# Patient Record
Sex: FEMALE | Race: WHITE | NOT HISPANIC OR LATINO | Employment: FULL TIME | URBAN - METROPOLITAN AREA
[De-identification: names, ages, dates, MRNs, and addresses within clinical notes are randomized per-mention and may not be internally consistent; named-entity substitution may affect disease eponyms.]

---

## 2022-08-01 ENCOUNTER — HOSPITAL ENCOUNTER (EMERGENCY)
Facility: HOSPITAL | Age: 27
Discharge: HOME | End: 2022-08-01
Attending: EMERGENCY MEDICINE

## 2022-08-01 ENCOUNTER — APPOINTMENT (EMERGENCY)
Dept: RADIOLOGY | Facility: HOSPITAL | Age: 27
End: 2022-08-01

## 2022-08-01 VITALS
HEART RATE: 80 BPM | OXYGEN SATURATION: 99 % | WEIGHT: 190 LBS | HEIGHT: 65 IN | TEMPERATURE: 98.1 F | BODY MASS INDEX: 31.65 KG/M2 | SYSTOLIC BLOOD PRESSURE: 140 MMHG | RESPIRATION RATE: 15 BRPM | DIASTOLIC BLOOD PRESSURE: 82 MMHG

## 2022-08-01 DIAGNOSIS — R42 DIZZINESS: Primary | ICD-10-CM

## 2022-08-01 LAB
ALBUMIN SERPL-MCNC: 4.6 G/DL (ref 3.4–5)
ALP SERPL-CCNC: 56 IU/L (ref 35–126)
ALT SERPL-CCNC: 18 IU/L (ref 11–54)
ANION GAP SERPL CALC-SCNC: 7 MEQ/L (ref 3–15)
AST SERPL-CCNC: 17 IU/L (ref 15–41)
BASOPHILS # BLD: 0.03 K/UL (ref 0.01–0.1)
BASOPHILS NFR BLD: 0.4 %
BILIRUB SERPL-MCNC: 0.6 MG/DL (ref 0.3–1.2)
BUN SERPL-MCNC: 12 MG/DL (ref 8–20)
CALCIUM SERPL-MCNC: 8.9 MG/DL (ref 8.9–10.3)
CHLORIDE SERPL-SCNC: 108 MEQ/L (ref 98–109)
CO2 SERPL-SCNC: 24 MEQ/L (ref 22–32)
CREAT SERPL-MCNC: 0.8 MG/DL (ref 0.6–1.1)
CRP SERPL-MCNC: 6.2 MG/L
DIFFERENTIAL METHOD BLD: NORMAL
EOSINOPHIL # BLD: 0.24 K/UL (ref 0.04–0.36)
EOSINOPHIL NFR BLD: 2.8 %
ERYTHROCYTE [DISTWIDTH] IN BLOOD BY AUTOMATED COUNT: 12.4 % (ref 11.7–14.4)
GFR SERPL CREATININE-BSD FRML MDRD: >60 ML/MIN/1.73M*2
GLUCOSE SERPL-MCNC: 105 MG/DL (ref 70–99)
HCT VFR BLDCO AUTO: 43.5 % (ref 35–45)
HGB BLD-MCNC: 14 G/DL (ref 11.8–15.7)
IMM GRANULOCYTES # BLD AUTO: 0.02 K/UL (ref 0–0.08)
IMM GRANULOCYTES NFR BLD AUTO: 0.2 %
LYMPHOCYTES # BLD: 3.05 K/UL (ref 1.2–3.5)
LYMPHOCYTES NFR BLD: 36.2 %
MCH RBC QN AUTO: 30.3 PG (ref 28–33.2)
MCHC RBC AUTO-ENTMCNC: 32.2 G/DL (ref 32.2–35.5)
MCV RBC AUTO: 94.2 FL (ref 83–98)
MONOCYTES # BLD: 0.38 K/UL (ref 0.28–0.8)
MONOCYTES NFR BLD: 4.5 %
NEUTROPHILS # BLD: 4.71 K/UL (ref 1.7–7)
NEUTS SEG NFR BLD: 55.9 %
NRBC BLD-RTO: 0 %
PDW BLD AUTO: 10.2 FL (ref 9.4–12.3)
PLATELET # BLD AUTO: 231 K/UL (ref 150–369)
POTASSIUM SERPL-SCNC: 3.5 MEQ/L (ref 3.6–5.1)
PROT SERPL-MCNC: 7.6 G/DL (ref 6–8.2)
RBC # BLD AUTO: 4.62 M/UL (ref 3.93–5.22)
SODIUM SERPL-SCNC: 139 MEQ/L (ref 136–144)
TROPONIN I SERPL HS-MCNC: 2.1 PG/ML
WBC # BLD AUTO: 8.43 K/UL (ref 3.8–10.5)

## 2022-08-01 PROCEDURE — 85652 RBC SED RATE AUTOMATED: CPT | Performed by: STUDENT IN AN ORGANIZED HEALTH CARE EDUCATION/TRAINING PROGRAM

## 2022-08-01 PROCEDURE — 93005 ELECTROCARDIOGRAM TRACING: CPT | Performed by: STUDENT IN AN ORGANIZED HEALTH CARE EDUCATION/TRAINING PROGRAM

## 2022-08-01 PROCEDURE — 80053 COMPREHEN METABOLIC PANEL: CPT

## 2022-08-01 PROCEDURE — G1004 CDSM NDSC: HCPCS

## 2022-08-01 PROCEDURE — 3E0337Z INTRODUCTION OF ELECTROLYTIC AND WATER BALANCE SUBSTANCE INTO PERIPHERAL VEIN, PERCUTANEOUS APPROACH: ICD-10-PCS | Performed by: EMERGENCY MEDICINE

## 2022-08-01 PROCEDURE — 85025 COMPLETE CBC W/AUTO DIFF WBC: CPT | Performed by: EMERGENCY MEDICINE

## 2022-08-01 PROCEDURE — 84484 ASSAY OF TROPONIN QUANT: CPT | Performed by: STUDENT IN AN ORGANIZED HEALTH CARE EDUCATION/TRAINING PROGRAM

## 2022-08-01 PROCEDURE — 99284 EMERGENCY DEPT VISIT MOD MDM: CPT | Mod: 25

## 2022-08-01 PROCEDURE — 96360 HYDRATION IV INFUSION INIT: CPT

## 2022-08-01 PROCEDURE — 36415 COLL VENOUS BLD VENIPUNCTURE: CPT

## 2022-08-01 PROCEDURE — 63700000 HC SELF-ADMINISTRABLE DRUG: Performed by: STUDENT IN AN ORGANIZED HEALTH CARE EDUCATION/TRAINING PROGRAM

## 2022-08-01 PROCEDURE — 86618 LYME DISEASE ANTIBODY: CPT | Performed by: STUDENT IN AN ORGANIZED HEALTH CARE EDUCATION/TRAINING PROGRAM

## 2022-08-01 PROCEDURE — 25800000 HC PHARMACY IV SOLUTIONS: Performed by: STUDENT IN AN ORGANIZED HEALTH CARE EDUCATION/TRAINING PROGRAM

## 2022-08-01 PROCEDURE — 80053 COMPREHEN METABOLIC PANEL: CPT | Performed by: EMERGENCY MEDICINE

## 2022-08-01 PROCEDURE — 86140 C-REACTIVE PROTEIN: CPT | Performed by: STUDENT IN AN ORGANIZED HEALTH CARE EDUCATION/TRAINING PROGRAM

## 2022-08-01 PROCEDURE — 85025 COMPLETE CBC W/AUTO DIFF WBC: CPT

## 2022-08-01 RX ORDER — MECLIZINE HYDROCHLORIDE 25 MG/1
25 TABLET ORAL ONCE
Status: COMPLETED | OUTPATIENT
Start: 2022-08-01 | End: 2022-08-01

## 2022-08-01 RX ORDER — MECLIZINE HYDROCHLORIDE 25 MG/1
25 TABLET ORAL 3 TIMES DAILY PRN
Qty: 30 TABLET | Refills: 0 | Status: SHIPPED | OUTPATIENT
Start: 2022-08-01 | End: 2022-08-08

## 2022-08-01 RX ADMIN — MECLIZINE HYDROCHLORIDE 25 MG: 25 TABLET ORAL at 19:45

## 2022-08-01 RX ADMIN — SODIUM CHLORIDE 1000 ML: 9 INJECTION, SOLUTION INTRAVENOUS at 19:45

## 2022-08-01 ASSESSMENT — ENCOUNTER SYMPTOMS
NUMBNESS: 1
FACIAL ASYMMETRY: 0
NAUSEA: 0
NECK PAIN: 0
CHILLS: 0
WEAKNESS: 0
VOMITING: 0
HEADACHES: 0
DIZZINESS: 1
LIGHT-HEADEDNESS: 0
FEVER: 0
SPEECH DIFFICULTY: 0
SHORTNESS OF BREATH: 0
CONFUSION: 0

## 2022-08-02 LAB
ATRIAL RATE: 67
ERYTHROCYTE [SEDIMENTATION RATE] IN BLOOD BY WESTERGREN METHOD: <3 MM/HR
P AXIS: 35
PR INTERVAL: 128
QRS DURATION: 86
QT INTERVAL: 422
QTC CALCULATION(BAZETT): 445
R AXIS: 41
T WAVE AXIS: -3
VENTRICULAR RATE: 67

## 2022-08-02 PROCEDURE — 93010 ELECTROCARDIOGRAM REPORT: CPT | Performed by: INTERNAL MEDICINE

## 2022-08-02 NOTE — ED PROVIDER NOTES
"Emergency Medicine Note  HPI   HISTORY OF PRESENT ILLNESS       History provided by:  Patient   used: No       28 y/o F with no significant PMH presents to the ED c/o dizziness x 1 month.  Patient reports that she has had dizziness for approximately 1 month described as \"off balance.\"  Patient reports that her dizziness is essentially constant but waxes and wanes in severity.  Dizziness is worse with positional changes and exertion and improved with resting still and closing her eyes.  Patient has been evaluated in urgent care and [facility] patient has had unremarkable blood work and has noted no ear infection.  Patient has been started on methylprednisolone without relief and the Epley maneuver has been attempted without relief.  Patient arrives here for further evaluation. Denies headaches, lightheadedness, numbness weakness in the upper or lower extremities, facial asymmetry, changes in hearing, vision, speech.  Denies nausea, vomiting, abdominal pain, chest pain, trouble breathing, neck pain, rashes, fevers, chills.  Patient has been otherwise acting at baseline per girlfriend.  No recent falls or injuries.  No recent illness or sick contacts.        Patient History   PAST HISTORY     Reviewed from Nursing Triage:  Tobacco  Allergies  Meds  Problems  Med Hx  Surg Hx  Fam Hx  Soc   Hx        History reviewed. No pertinent past medical history.    History reviewed. No pertinent surgical history.    History reviewed. No pertinent family history.    Social History     Tobacco Use   • Smoking status: Never Smoker   • Smokeless tobacco: Never Used   Substance Use Topics   • Alcohol use: Never   • Drug use: Yes     Types: Marijuana         Review of Systems   REVIEW OF SYSTEMS     Review of Systems   Constitutional: Negative for chills and fever.   Respiratory: Negative for shortness of breath.    Cardiovascular: Negative for chest pain.   Gastrointestinal: Negative for nausea and " vomiting.   Musculoskeletal: Negative for neck pain.   Skin: Negative for rash.   Neurological: Positive for dizziness and numbness. Negative for syncope, facial asymmetry, speech difficulty, weakness, light-headedness and headaches.   Psychiatric/Behavioral: Negative for confusion.         VITALS     ED Vitals    Date/Time Temp Pulse Resp BP SpO2 [facility]   08/01/22 2110 -- 80 15 140/82 99 % DWF   08/01/22 1846 36.7 °C (98.1 °F) 86 16 158/87 100 % HERLINDA        Pulse Ox %: 100 % (08/01/22 1846)  Pulse Ox Interpretation: Normal (08/01/22 1846)  Heart Rate: 86 (08/01/22 1846)  Rhythm Strip Interpretation: Other (see comments) (Sinus rhythm with marked sinus arrhythmia with PVCs.) (08/01/22 1846)     Physical Exam   PHYSICAL EXAM     Physical Exam  Vitals and nursing note reviewed.   Constitutional:       General: She is not in acute distress.     Appearance: She is well-developed.   HENT:      Head: Normocephalic and atraumatic.      Nose: Nose normal.      Mouth/Throat:      Mouth: Mucous membranes are moist.   Eyes:      Extraocular Movements: Extraocular movements intact.      Right eye: Nystagmus present.      Left eye: Nystagmus present.      Conjunctiva/sclera: Conjunctivae normal.      Pupils: Pupils are equal, round, and reactive to light.      Comments: Horizontal nystagmus when looking to the left.   Cardiovascular:      Rate and Rhythm: Normal rate and regular rhythm.      Heart sounds: Normal heart sounds.   Pulmonary:      Effort: Pulmonary effort is normal. No respiratory distress.      Breath sounds: Normal breath sounds.   Abdominal:      Palpations: Abdomen is soft.      Tenderness: There is no abdominal tenderness.   Musculoskeletal:         General: Normal range of motion.      Cervical back: Normal range of motion and neck supple.   Skin:     General: Skin is warm and dry.      Findings: No rash.   Neurological:      General: No focal deficit present.      Mental Status: She is alert and oriented to  person, place, and time.      Comments: CN II-XII grossly intact. Symmetric facial movements. Tongue is midline. Sensation to light touch is intact in B/L upper and lower extremities. 5/5 strength to B/L upper and lower extremities. Speech is clear. No pronator drift. Normal finger to nose B/L. Gait intact.            PROCEDURES     Procedures     DATA     Results     Procedure Component Value Units Date/Time    [place] ([place] [facility] [[id number]] Collected: 08/01/22 2057    Specimen: Blood, Venous Updated: 08/01/22 2109    C-reactive protein [[id number]]  (Normal) Collected: 08/01/22 1947    Specimen: Blood, Venous Updated: 08/01/22 2053     CRP 6.20 mg/L     HS Troponin I (with 2 hour reflex) [[id number]]  (Normal) Collected: 08/01/22 1947    Specimen: Blood, Venous Updated: 08/01/22 2031     High Sens Troponin I 2.1 pg/mL     Lyme EIA reflex WB [[id number]] Collected: 08/01/22 1852    Specimen: Blood, Venous Updated: 08/01/22 2026    Comprehensive metabolic panel [[id number]]  (Abnormal) Collected: 08/01/22 1852    Specimen: Blood, Venous Updated: 08/01/22 1915     Sodium 139 mEQ/L      Potassium 3.5 mEQ/L      Comment: Results obtained on plasma. Plasma Potassium values may be up to 0.4 mEQ/L less than serum values. The differences may be greater for patients with high platelet or white cell counts.        Chloride 108 mEQ/L      CO2 24 mEQ/L      BUN 12 mg/dL      Creatinine 0.8 mg/dL      Glucose 105 mg/dL      Calcium 8.9 mg/dL      AST (SGOT) 17 IU/L      ALT (SGPT) 18 IU/L      Alkaline Phosphatase 56 IU/L      Total Protein 7.6 g/dL      Comment: Test performed on plasma which typically contains approximately 0.4 g/dL more protein than serum.        Albumin 4.6 g/dL      Bilirubin, Total 0.6 mg/dL      eGFR >60.0 mL/min/1.73m*2      Anion Gap 7 mEQ/L     CBC and differential [[id number]] Collected: 08/01/22 1852    Specimen: Blood, Venous Updated: 08/01/22 1902     WBC 8.43 K/uL      RBC 4.62 M/uL      Hemoglobin  14.0 g/dL      Hematocrit 43.5 %      MCV 94.2 fL      MCH 30.3 pg      MCHC 32.2 g/dL      RDW 12.4 %      Platelets 231 K/uL      MPV 10.2 fL      Differential Type Auto     nRBC 0.0 %      Immature Granulocytes 0.2 %      Neutrophils 55.9 %      Lymphocytes 36.2 %      Monocytes 4.5 %      Eosinophils 2.8 %      Basophils 0.4 %      Immature Granulocytes, Absolute 0.02 K/uL      Neutrophils, Absolute 4.71 K/uL      Lymphocytes, Absolute 3.05 K/uL      Monocytes, Absolute 0.38 K/uL      Eosinophils, Absolute 0.24 K/uL      Basophils, Absolute 0.03 K/uL     RAINBOW GOLD [[id number]] Collected: 08/01/22 1852    Specimen: Blood, Venous Updated: 08/01/22 1857    RAINBOW RED [[id number]] Collected: 08/01/22 1852    Specimen: Blood, Venous Updated: 08/01/22 1857    [facility] Draw Panel [[id number]] Collected: 08/01/22 1852    Specimen: Blood, Venous Updated: 08/01/22 1857    Narrative:      The following orders were created for panel order [facility] Draw Panel.  Procedure                               Abnormality         Status                     ---------                               -----------         ------                     RAINBOW RED[[id number]]                                      In process                 RAINBOW LT BLUE[[id number]]                                  In process                 RAINBOW GOLD[[id number]]                                     In process                   Please view results for these tests on the individual orders.    RAINBOW LT BLUE [[id number]] Collected: 08/01/22 1852    Specimen: Blood, Venous Updated: 08/01/22 1857          Imaging Results          CT HEAD WITHOUT IV CONTRAST (Final result)  Result time 08/01/22 19:37:32    Final result                 Impression:    IMPRESSION:  No acute intracranial process.               Narrative:    CLINICAL HISTORY: Dizziness, persistent, recurring    TECHNIQUE:   CT of the head without contrast.    COMPARISON:   None available.    CT DOSE:  One  "or more dose reduction techniques (e.g. automated exposure  control, adjustment of the mA and/or kV according to patient size, use of  iterative reconstruction technique) utilized for this examination.    COMMENT:  The ventricles and sulci are normal in size and configuration. The gray-white  differentiation is preserved.  No extra axial collection. No intracranial  hemorrhage. No mass effect, midline shift, or edema. No acute, large vascular  territory infarct.  No apparent acute osseous findings in the calvarium.                                ECG 12 lead   ED Interpretation   Interpreted with Dr. Snyder.   Rate: 67  Axis: Normal  Pr interval: WNL  QRS: WNL  Qtc: 445  Sinus rhythm with sinus arrhythmia with frequent PVCs.  No obvious ischemia.             Scoring tools                                 ED Course & MDM   MDM / ED COURSE / CLINICAL IMPRESSIONS / DISPO     MDM    ED Course as of 08/01/22 2226   Mon Aug 01, 2022   1901 Impression: Dizziness described as \"off balance\" x 1 month    Plan: labs, tropes, EKG, CT head, IVF, meclizine, Observe    Discussed case with Dr. Snyder [EB]   1930 CBC and CMP unremarkable. [EB]   2012 CT HEAD IMPRESSION:  No acute intracranial process. [EB]   2056 CRP: 6.20  WNL [EB]   2059 Noted mild improvement with meclizine.  Will discharge home with meclizine, ENT follow-up, neurology follow-up, supportive care, and strict return precautions.  ESR and Lyme titer still pending. [EB]      ED Course User Index  [EB] Elena Desir PA C Boylan, Emily, PA C  08/01/22 2226    "

## 2022-08-02 NOTE — DISCHARGE INSTRUCTIONS
You were seen in the ER for dizziness. Your blood was negative for acute signs of infection, anemia, or electrolyte imbalances. Your troponin which is a cardiac biomarker for distress was negative. Your EKG showed no signs of ischemia.  Your CT head showed no acute intracranial processes.  Your Lyme testing is still pending as well as the inflammatory markers including CRP and ESR.  You will be notified of any abnormal results.    It is recommended that you follow up with ENT for further evaluation and management.  We also provided you with the contact information for a neurologist for further evaluation.    You were started on meclizine (Antivert).  Please take medication as prescribed.    You may take Motrin (500 mg) every 6 hours for pain. You may also take Tylenol (500 mg) every 6 hours for pain. You may alternate the two medications every 3 hours for complete pain control.     Please rest and get adequate sleep, approximately 7-8 hours night. Please drink plenty of fluids and stay hydrated.     Please return to the ER for any worsening dizziness, lightheadedness, chest pain, trouble breathing, palpitations, leg pain, leg swelling, back pain, nausea, vomiting, abdominal pain, or any other new or concerning symptoms      room air

## 2022-08-03 LAB — B BURGDOR AB SER IA-ACNC: 0.4 RATIO

## 2022-08-22 NOTE — ED ATTESTATION NOTE
Procedures  Physical Exam  Review of Systems    8/22/202212:33 PM  I have personally seen and examined the patient.  I reviewed and agree with the PA/NP/Resident's assessment and plan of care.    My examination, assessment, and plan of care of Renea Pabon is as follows:  The patient presents with complaints of feeling off balance.  Patient has had dizziness for the past 1 month.  Her symptoms are worse when she changes positions or exerts herself.  She has been taking a Medrol Dosepak without improvement.  Exam: The patient was alert in no acute distress.  She did become symptomatic with movement of her head and neck.  There was nystagmus with fast component to the left.  She had no focal neurologic deficits. impression/Plan: The patient was evaluated with lab work including sed rate and Lyme titer.  There were no significant findings on the available lab results.  CAT scan of the brain revealed no intracranial abnormalities.  The patient was treated with meclizine with mild improvement and was discharged home on meclizine and will follow-up with ENT and neurology.  She was given strict return cautions.    Vital Sign Review: Vital signs have been ordered and reviewed. The oxygen saturation is 100% on room air, normal     I was physically present for the key/critical portions of the following procedures: None    This document was created using dragon dictation software.  There might be some typographical errors due to this technology.     Luis Snyder, DO  08/22/22 1282

## 2022-08-30 ENCOUNTER — TRANSCRIBE ORDERS (OUTPATIENT)
Dept: SCHEDULING | Age: 27
End: 2022-08-30

## 2022-08-30 DIAGNOSIS — H90.41 SENSORINEURAL HEARING LOSS, UNILATERAL, RIGHT EAR, WITH UNRESTRICTED HEARING ON THE CONTRALATERAL SIDE: Primary | ICD-10-CM

## 2022-09-08 ENCOUNTER — PATIENT OUTREACH (OUTPATIENT)
Dept: COMMUNITY HEALTH | Facility: CLINIC | Age: 27
End: 2022-09-08
Payer: COMMERCIAL

## 2022-09-08 NOTE — PROGRESS NOTES
NAME: Renea Pabon    MRN: [id number]    YOB: 1995    PCP ASSESSMENT:    Do you have a PCP: No (Patient just got inurance-  His physician retired and he is in the process of searching for another PCP)  Would you like help finding a PCP: No (does not need any assistance at this time)          Do you have any other social needs you would like to address: No          Rani Izquierdo, MPH,[facility]

## 2022-09-14 ENCOUNTER — HOSPITAL ENCOUNTER (OUTPATIENT)
Dept: RADIOLOGY | Facility: HOSPITAL | Age: 27
Discharge: HOME | End: 2022-09-14
Attending: OTOLARYNGOLOGY
Payer: COMMERCIAL

## 2022-09-14 ENCOUNTER — TRANSCRIBE ORDERS (OUTPATIENT)
Dept: ADMINISTRATIVE | Age: 27
End: 2022-09-14

## 2022-09-14 VITALS — WEIGHT: 190 LBS | BODY MASS INDEX: 32.11 KG/M2

## 2022-09-14 DIAGNOSIS — H90.41 SENSORINEURAL HEARING LOSS, UNILATERAL, RIGHT EAR, WITH UNRESTRICTED HEARING ON THE CONTRALATERAL SIDE: Primary | ICD-10-CM

## 2022-09-14 DIAGNOSIS — H90.41 SENSORINEURAL HEARING LOSS, UNILATERAL, RIGHT EAR, WITH UNRESTRICTED HEARING ON THE CONTRALATERAL SIDE: ICD-10-CM

## 2022-09-14 PROCEDURE — 70553 MRI BRAIN STEM W/O & W/DYE: CPT

## 2022-09-14 PROCEDURE — A9585 GADOBUTROL INJECTION: HCPCS | Performed by: OTOLARYNGOLOGY

## 2022-09-14 RX ORDER — GADOBUTROL 604.72 MG/ML
0.1 INJECTION INTRAVENOUS ONCE
Status: COMPLETED | OUTPATIENT
Start: 2022-09-14 | End: 2022-09-14

## 2022-09-14 RX ADMIN — GADOBUTROL 8.6 MMOL: 604.72 INJECTION INTRAVENOUS at 18:56

## 2022-09-22 LAB
EXTERNAL HIV CONFIRMATION: NORMAL
EXTERNAL HIV SCREEN: NORMAL
HCV AB SER-ACNC: NONREACTIVE

## 2022-09-23 ENCOUNTER — TRANSCRIBE ORDERS (OUTPATIENT)
Dept: SCHEDULING | Age: 27
End: 2022-09-23

## 2022-09-23 DIAGNOSIS — G35 MULTIPLE SCLEROSIS (CMS/HCC): Primary | ICD-10-CM

## 2023-09-22 PROBLEM — G35 MULTIPLE SCLEROSIS (HCC): Status: ACTIVE | Noted: 2022-10-25

## 2023-09-22 PROBLEM — R53.83 OTHER FATIGUE: Status: ACTIVE | Noted: 2022-07-25

## 2023-09-22 PROBLEM — R26.89 OTHER ABNORMALITIES OF GAIT AND MOBILITY: Status: ACTIVE | Noted: 2022-07-25

## 2023-09-22 PROBLEM — H53.9 UNSPECIFIED VISUAL DISTURBANCE: Status: ACTIVE | Noted: 2022-07-25

## 2023-09-22 RX ORDER — OCRELIZUMAB 300 MG/10ML
INJECTION INTRAVENOUS
COMMUNITY

## 2023-09-22 NOTE — PROGRESS NOTES
ADULT ANNUAL [facility] IN PARTNERSHIP WITH [facility]'S    NAME: Magno Luke  AGE: 29 y.o. SEX: female  : 1995     DATE: 10/2/2023     Assessment and Plan:     Problem List Items Addressed This Visit        Nervous and Auditory    Multiple sclerosis [facility])     Patient follows with [facility] in [place]. She takes Ocrevus IV treatments for multiple sclerosis. Her primary symptoms are usually some form of dizziness, fatigue and visual changes during pseudo flares. She has been doing well with the IV medication. She is noticing that she gets sick more often now on immune suppressants. She needed to establish with a family doctor that she can see when she gets sick. Other    Obesity (BMI 30.0-34.9)   Other Visit Diagnoses     Annual physical exam    -  Primary    Patient will have labs done by neurology in the coming month. She will have them faxed over to our office. Screening for HIV (human immunodeficiency virus)        We will obtain records in chart    Need for hepatitis C screening test        We will obtain records in chart    Encounter for vaccination        Patient recently had DTaP last year    Cervical cancer screening        Will refer to [address] [place] [address]. Relevant Orders    Ambulatory referral to Gynecology          Immunizations and preventive care screenings were discussed with patient today. Appropriate education was printed on patient's after visit summary. Counseling:  Alcohol/drug use: discussed moderation in alcohol intake, the recommendations for healthy alcohol use, and avoidance of illicit drug use. Dental Health: discussed importance of regular tooth brushing, flossing, and dental visits. · Exercise: the importance of regular exercise/physical activity was discussed. Recommend exercise 3-5 times per week for at least 30 minutes. BMI Counseling:  Body mass index is 31.91 kg/m². The BMI is above normal. Nutrition recommendations include encouraging healthy choices of fruits and vegetables and increasing intake of lean protein. Exercise recommendations include moderate physical activity 150 minutes/week. Rationale for BMI follow-up plan is due to patient being overweight or obese. Depression Screening and Follow-up Plan: Patient was screened for depression during today's encounter. They screened negative with a PHQ-2 score of 0. Patient was seen today for an annual physical exam. Age appropriate screening tests were done as above. Annual labs were ordered to be done through [facility]. Patient will be contacted regarding results and follow up will be based on findings. Patient was counseled on the importance of proper diet and exercise. I recommend diets rich in lean meats and leafy green vegetables. Try to have 150 minutes of exercise weekly at moderate intensity. See problem based charting for any chronic problems or new findings and current workup/management. 40 minutes were spent on chart review, examination, and plan of care. This note was dictated using software. Return in about 1 year (around 10/2/2024) for Annual physical.     Chief Complaint:     Chief Complaint   Patient presents with   • Care Gap Request     Bmi follow up      History of Present Illness:     Adult Annual Physical   Patient here for a comprehensive physical exam. The patient reports establish. Diet and Physical Activity  · Diet/Nutrition: meditteranean diet. · Exercise: crossfit 4-5 times a week. Depression Screening  PHQ-2/9 Depression Screening    Little interest or pleasure in doing things: 0 - not at all  Feeling down, depressed, or hopeless: 0 - not at all  PHQ-2 Score: 0  PHQ-2 Interpretation: Negative depression screen       General Health  · Sleep: gets 7-8 hours of sleep on average.    · Hearing: normal - bilateral.  · Vision: no vision problems and vision changes with ms pseudoflares. lasik  · Dental: regular dental visits and brushes teeth twice daily. /GYN Health  · Patient needs to switch to new gyn     Review of Systems:     Review of Systems   Constitutional: Positive for fatigue. Eyes: Negative for visual disturbance (intermittent). Respiratory: Negative for shortness of breath. Cardiovascular: Negative for chest pain. Gastrointestinal: Negative for abdominal pain, constipation and diarrhea. Genitourinary: Negative for difficulty urinating. Skin: Negative for rash. Neurological: Negative for dizziness (intermittent).       Past Medical History:     Past Medical History:   Diagnosis Date   • MS (multiple sclerosis) ([address])       Past Surgical History:     Past Surgical History:   Procedure Laterality Date   • TONSILECTOMY AND ADNOIDECTOMY     • WISDOM TOOTH EXTRACTION        Social History:     Social History     Socioeconomic History   • Marital status: Single     Spouse name: None   • Number of children: None   • Years of education: None   • Highest education level: None   Occupational History   • None   Tobacco Use   • Smoking status: Never   • Smokeless tobacco: Never   Substance and Sexual Activity   • Alcohol use: Not Currently   • Drug use: Never   • Sexual activity: None   Other Topics Concern   • None   Social History Narrative   • None     Social Determinants of Health     Financial Resource Strain: Not on file   Food Insecurity: Not on file   Transportation Needs: Not on file   Physical Activity: Not on file   Stress: Not on file   Social Connections: Not on file   Intimate Partner Violence: Not on file   Housing Stability: Not on file      Family History:     Family History   Problem Relation Age of Onset   • Hypertension Mother    • Diabetes Mother    • Depression Father    • Hypertension Father    • Colon cancer Maternal Grandmother    • Cancer Maternal Grandfather    • Colon cancer Paternal Grandmother    • Colon cancer Paternal Grandfather       Current Medications:     Current Outpatient Medications   Medication Sig Dispense Refill   • ocrelizumab (Ocrevus) 300 MG/10ML SOLN as directed Intravenous     • Omega-3 Fatty Acids (Fish Oil) 500 MG CAPS Take by mouth daily     • VITAMIN D PO Vitamin D       No current facility-administered medications for this visit. Allergies: Allergies   Allergen Reactions   • Diphenhydramine Swelling      Physical Exam:     /74 (BP Location: Left arm, Patient Position: Sitting, Cuff Size: Standard)   Pulse 70   Temp (!) 97.3 °F (36.3 °C)   Ht 5' 4.5" (1.638 m)   Wt 85.6 kg (188 lb 12.8 oz)   SpO2 99%   BMI 31.91 kg/m²     Physical Exam  Vitals and nursing note reviewed. Constitutional:       General: She is not in acute distress. Appearance: Normal appearance. She is well-developed. HENT:      Head: Normocephalic and atraumatic. Right Ear: Tympanic membrane, ear canal and external ear normal. There is no impacted cerumen. Left Ear: Tympanic membrane, ear canal and external ear normal. There is no impacted cerumen. Nose: Nose normal. No congestion. Mouth/Throat:      Mouth: Mucous membranes are moist.      Pharynx: No oropharyngeal exudate or posterior oropharyngeal erythema. Eyes:      Extraocular Movements: Extraocular movements intact. Conjunctiva/sclera: Conjunctivae normal.      Pupils: Pupils are equal, round, and reactive to light. Cardiovascular:      Rate and Rhythm: Normal rate and regular rhythm. Heart sounds: Normal heart sounds. No murmur heard. Pulmonary:      Effort: Pulmonary effort is normal. No respiratory distress. Breath sounds: Normal breath sounds. No wheezing. Abdominal:      General: Abdomen is flat. Palpations: Abdomen is soft. Tenderness: There is no abdominal tenderness. Musculoskeletal:         General: Normal range of motion. Cervical back: Normal range of motion and neck supple. Lymphadenopathy:      Cervical: No cervical adenopathy. Skin:     General: Skin is warm and dry. Findings: No rash. Neurological:      General: No focal deficit present. Mental Status: She is alert and oriented to person, place, and time. Mental status is at baseline. Psychiatric:         Mood and Affect: Mood normal.         Behavior: Behavior normal.         Thought Content:  Thought content normal.         Judgment: Judgment normal.          Destini Wild, DO   [address] [place] [address] WITH [facility]

## 2023-09-25 ENCOUNTER — RA CDI HCC (OUTPATIENT)
Dept: OTHER | Facility: HOSPITAL | Age: 28
End: 2023-09-25

## 2023-09-25 NOTE — PROGRESS NOTES
[facility] coding opportunities       Chart reviewed, no opportunity found: CHART REVIEWED, NO OPPORTUNITY FOUND        Patients Insurance        Commercial Insurance: Elijah Villanueva

## 2023-10-02 ENCOUNTER — OFFICE VISIT (OUTPATIENT)
Age: 28
End: 2023-10-02

## 2023-10-02 VITALS
DIASTOLIC BLOOD PRESSURE: 74 MMHG | SYSTOLIC BLOOD PRESSURE: 132 MMHG | WEIGHT: 188.8 LBS | OXYGEN SATURATION: 99 % | HEIGHT: 65 IN | TEMPERATURE: 97.3 F | HEART RATE: 70 BPM | BODY MASS INDEX: 31.46 KG/M2

## 2023-10-02 DIAGNOSIS — Z11.4 SCREENING FOR HIV (HUMAN IMMUNODEFICIENCY VIRUS): ICD-10-CM

## 2023-10-02 DIAGNOSIS — Z12.4 CERVICAL CANCER SCREENING: ICD-10-CM

## 2023-10-02 DIAGNOSIS — Z11.59 NEED FOR HEPATITIS C SCREENING TEST: ICD-10-CM

## 2023-10-02 DIAGNOSIS — Z00.00 ANNUAL PHYSICAL EXAM: Primary | ICD-10-CM

## 2023-10-02 DIAGNOSIS — G35 MULTIPLE SCLEROSIS (HCC): ICD-10-CM

## 2023-10-02 DIAGNOSIS — Z23 ENCOUNTER FOR VACCINATION: ICD-10-CM

## 2023-10-02 DIAGNOSIS — E66.9 OBESITY (BMI 30.0-34.9): ICD-10-CM

## 2023-10-02 PROBLEM — E66.811 OBESITY (BMI 30.0-34.9): Status: ACTIVE | Noted: 2023-10-02

## 2023-10-02 PROCEDURE — 99385 PREV VISIT NEW AGE 18-39: CPT | Performed by: STUDENT IN AN ORGANIZED HEALTH CARE EDUCATION/TRAINING PROGRAM

## 2023-10-02 NOTE — ASSESSMENT & PLAN NOTE
Patient follows with [address] [place] [address] in [place]. She takes Ocrevus IV treatments for multiple sclerosis. Her primary symptoms are usually some form of dizziness, fatigue and visual changes during pseudo flares. She has been doing well with the IV medication. She is noticing that she gets sick more often now on immune suppressants. She needed to establish with a family doctor that she can see when she gets sick.

## 2023-10-03 ENCOUNTER — TELEPHONE (OUTPATIENT)
Dept: ADMINISTRATIVE | Facility: OTHER | Age: 28
End: 2023-10-03

## 2023-10-03 NOTE — TELEPHONE ENCOUNTER
----- Message from Cecelia Sharon sent at 10/2/2023  5:22 PM EDT -----  Regarding: Care Gap Request  10/02/23 5:22 PM    Hello, our patient attached above has had Hepatitis C and HIV completed/performed. Please assist in updating the patient chart by pulling the Care Everywhere (CE) document. The date of service is 9/22/22.      Thank you,  [address] [facility]

## 2023-10-03 NOTE — TELEPHONE ENCOUNTER
Upon review of the In Basket request we were able to locate, review, and update the patient chart as requested for Hepatitis C  and HIV. Any additional questions or concerns should be emailed to the Practice Liaisons via the appropriate education email address, please do not reply via In Basket.     Thank you  Chris oMrin

## 2023-10-23 ENCOUNTER — CLINICAL SUPPORT (OUTPATIENT)
Age: 28
End: 2023-10-23

## 2023-10-23 DIAGNOSIS — Z23 ENCOUNTER FOR IMMUNIZATION: Primary | ICD-10-CM

## 2023-10-23 PROCEDURE — 90471 IMMUNIZATION ADMIN: CPT

## 2023-10-23 PROCEDURE — 90686 IIV4 VACC NO PRSV 0.5 ML IM: CPT

## 2024-03-13 ENCOUNTER — OFFICE VISIT (OUTPATIENT)
Age: 29
End: 2024-03-13

## 2024-03-13 VITALS
HEIGHT: 65 IN | DIASTOLIC BLOOD PRESSURE: 78 MMHG | OXYGEN SATURATION: 99 % | HEART RATE: 58 BPM | SYSTOLIC BLOOD PRESSURE: 112 MMHG | TEMPERATURE: 98.4 F | WEIGHT: 192 LBS | BODY MASS INDEX: 31.99 KG/M2

## 2024-03-13 DIAGNOSIS — H93.8X2 CONGESTION OF LEFT EAR: ICD-10-CM

## 2024-03-13 DIAGNOSIS — M26.609 TMJ (TEMPOROMANDIBULAR JOINT DISORDER): Primary | ICD-10-CM

## 2024-03-13 DIAGNOSIS — J02.9 SORE THROAT: ICD-10-CM

## 2024-03-13 DIAGNOSIS — R09.82 POST-NASAL DRIP: ICD-10-CM

## 2024-03-13 PROBLEM — S03.00XA DISLOCATION OF JAW: Status: ACTIVE | Noted: 2024-03-13

## 2024-03-13 LAB — S PYO AG THROAT QL: NEGATIVE

## 2024-03-13 PROCEDURE — 99213 OFFICE O/P EST LOW 20 MIN: CPT | Performed by: NURSE PRACTITIONER

## 2024-03-13 PROCEDURE — 87880 STREP A ASSAY W/OPTIC: CPT | Performed by: NURSE PRACTITIONER

## 2024-03-13 RX ORDER — METHYLPREDNISOLONE 4 MG/1
TABLET ORAL
Start: 2024-03-13

## 2024-03-13 NOTE — ASSESSMENT & PLAN NOTE
On and off 3 out of 10 aching ear pain for the last 2 weeks appears to be consistent with congestion of ear.  Advised on Zyrtec-D as well as Flonase.  No signs of infection noted on exam.  If no improvement or worsening of symptoms please follow back up in office.  Patient verbalized understanding.

## 2024-03-13 NOTE — ASSESSMENT & PLAN NOTE
Symptoms of off and on sore throat appear to be consistent with irritation from postnasal drip.  Strep test negative today.  Advised Zyrtec-D and Flonase daily.  If no improvement or if symptoms worsen please follow-up in the office.

## 2024-03-13 NOTE — ASSESSMENT & PLAN NOTE
"Patient reports nighttime teeth grinding, and recent \"pop \"of the left side of her jaw which has resulted in pain.  On exam and audible and palpable click and pop was noted when patient opens jaw wide.  Medrol Dosepak given for pain today.  We discussed if no relief consideration of physical therapy.  Patient verbalized understanding.  We also discussed gel rest which includes avoiding foods such as bagels, chewing gum, and foods which are difficult to chew.  Discussed using nighttime mouthguard to avoid grinding of teeth.  "

## 2024-03-13 NOTE — PATIENT INSTRUCTIONS
Temporomandibular Disorder   WHAT YOU NEED TO KNOW:   Temporomandibular disorder is a condition that causes pain in your jaw. The disorder affects the joint between your temporal bone and your mandible (jawbone). The muscles and nerves around the joint are also affected.       DISCHARGE INSTRUCTIONS:   Medicines:   Pain medicine:  You may be given a prescription medicine to decrease pain. Do not wait until the pain is severe before you take this medicine.    NSAIDs:  These medicines decrease swelling and pain. You can buy NSAIDs without a doctor's order. Ask your healthcare provider which medicine is right for you, and how much to take. Take as directed. NSAIDs can cause stomach bleeding or kidney problems if not taken correctly.    Muscle relaxers  help decrease pain and muscle spasms.    Take your medicine as directed.  Contact your healthcare provider if you think your medicine is not helping or if you have side effects. Tell your provider if you are allergic to any medicine. Keep a list of the medicines, vitamins, and herbs you take. Include the amounts, and when and why you take them. Bring the list or the pill bottles to follow-up visits. Carry your medicine list with you in case of an emergency.    Follow up with your doctor as directed:  Write down your questions so you remember to ask them during your visits.  Manage your symptoms:   Eat soft foods:  Your healthcare provider may suggest that you eat only soft foods for several days. A dietitian may work with you to find foods that are easier to bite, chew, or swallow. Examples are soup, applesauce, cottage cheese, pudding, yogurt, and soft fruits.    Use jaw supporting devices:  Splints may be used to support your jaw or keep it from moving. You may need to wear a mouth guard to keep you from clenching or grinding your teeth while you are sleeping.    Use ice and heat:  Ice helps decrease swelling and pain. Ice may also help prevent tissue damage. Use an  ice pack, or put crushed ice in a plastic bag. Cover it with a towel and place it on your jaw for 15 to 20 minutes every hour or as directed. After the first 24 to 48 hours, use heat to decrease pain, swelling, and muscle spasms. Apply heat on the area for 20 to 30 minutes every 2 hours for as many days as directed. Use a heating pad, moist warm compress, or a hot water bottle.    Go to physical therapy:  A physical therapist teaches you exercises to help improve movement and strength, and to decrease pain in your jaw. A speech therapist may also help you with swallowing and speech exercises.    Contact your healthcare provider if:   You have a fever.    Your splint or mouth guard is loose.    You have questions or concerns about your condition or care.    Seek care immediately or call 911 if:   You have nausea, are vomiting, or cannot keep liquids down.    You have pain that does not go away even after you take your pain medicine.    You have problems breathing, talking, drinking, eating, or swallowing.    Your splint or mouth guard gets damaged or broken.    © Copyright [employer] 2023 Information is for End User's use only and may not be sold, redistributed or otherwise used for commercial purposes.  The above information is an  only. It is not intended as medical advice for individual conditions or treatments. Talk to your doctor, nurse or pharmacist before following any medical regimen to see if it is safe and effective for you.

## 2024-03-13 NOTE — PROGRESS NOTES
"Name: Louann Otoole      : 1995      MRN: [id number]  Encounter Provider: LALO Ruiz  Encounter Date: 3/13/2024   Encounter department: [facility] IN PARTNERSHIP WITH [facility]    Assessment & Plan     1. TMJ (temporomandibular joint disorder)  Assessment & Plan:  Patient reports nighttime teeth grinding, and recent \"pop \"of the left side of her jaw which has resulted in pain.  On exam and audible and palpable click and pop was noted when patient opens jaw wide.  Medrol Dosepak given for pain today.  We discussed if no relief consideration of physical therapy.  Patient verbalized understanding.  We also discussed gel rest which includes avoiding foods such as bagels, chewing gum, and foods which are difficult to chew.  Discussed using nighttime mouthguard to avoid grinding of teeth.    Orders:  -     methylPREDNISolone 4 MG tablet therapy pack; Use as directed on package    2. Congestion of left ear  Assessment & Plan:  On and off 3 out of 10 aching ear pain for the last 2 weeks appears to be consistent with congestion of ear.  Advised on Zyrtec-D as well as Flonase.  No signs of infection noted on exam.  If no improvement or worsening of symptoms please follow back up in office.  Patient verbalized understanding.      3. Post-nasal drip  Assessment & Plan:  Symptoms of off and on sore throat appear to be consistent with irritation from postnasal drip.  Strep test negative today.  Advised Zyrtec-D and Flonase daily.  If no improvement or if symptoms worsen please follow-up in the office.      4. Sore throat  -     POCT rapid ANTIGEN strepA           Subjective      28-year-old female here today with concerns of left ear ache which comes and goes, and at its worst is a 3 out of 10.  Patient also reporting on and off sore throat.  Symptoms have been present and off for the past 2 weeks.  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  She denies pain when swallowing, fevers, cough, diarrhea, rash, discharge from ear.  Patient also reporting that she has been grinding her teeth at night, 2 weeks ago she felt her jaw on the left side \"pop\" and has been experiencing pain.  Patient does have history of adenoids and tonsil removal.  She reports otitis externa as a child.  Patient reports she did buy mouthguard but has not started using yet for teeth grinding.    Earache   There is pain in the left ear. The current episode started 1 to 4 weeks ago. There has been no fever. The pain is at a severity of 3/10. Associated symptoms include a sore throat. Pertinent negatives include no abdominal pain, coughing, diarrhea, ear discharge, headaches, hearing loss, neck pain, rash, rhinorrhea or vomiting. She has tried nothing for the symptoms. There is no history of a chronic ear infection, hearing loss or a tympanostomy tube.   Sore Throat   There has been no fever. Associated symptoms include ear pain and swollen glands. Pertinent negatives include no abdominal pain, coughing, diarrhea, ear discharge, headaches, neck pain, shortness of breath, stridor, trouble swallowing or vomiting. She has had exposure to strep. She has tried nothing for the symptoms. The treatment provided no relief.     Review of Systems   Constitutional: Negative.  Negative for chills, fatigue and fever.   HENT:  Positive for ear pain, postnasal drip and sore throat. Negative for ear discharge, facial swelling, hearing loss, mouth sores, nosebleeds, rhinorrhea, sinus pressure, sinus pain, tinnitus and trouble swallowing.    Respiratory:  Negative for apnea, cough, chest tightness, shortness of breath, wheezing and stridor.    Gastrointestinal:  Negative for abdominal pain, diarrhea and vomiting.   Musculoskeletal:  Negative for neck pain.   Skin:  Negative for rash.   Neurological: Negative.  Negative for headaches.       Current Outpatient Medications on " "File Prior to Visit   Medication Sig    ocrelizumab (Ocrevus) 300 MG/10ML SOLN as directed Intravenous    Omega-3 Fatty Acids (Fish Oil) 500 MG CAPS Take by mouth daily    VITAMIN D PO Vitamin D       Objective     /78 (BP Location: Right arm, Patient Position: Sitting, Cuff Size: Standard)   Pulse 58   Temp 98.4 °F (36.9 °C)   Ht 5' 4.5\" (1.638 m)   Wt 87.1 kg (192 lb)   SpO2 99%   BMI 32.45 kg/m²     Physical Exam  Vitals and nursing note reviewed.   Constitutional:       General: She is not in acute distress.     Appearance: Normal appearance.   HENT:      Head: Normocephalic and atraumatic.      Right Ear: Tympanic membrane, ear canal and external ear normal. No drainage, swelling or tenderness. No middle ear effusion. Tympanic membrane is not erythematous.      Left Ear: Tympanic membrane, ear canal and external ear normal. No drainage, swelling or tenderness.  No middle ear effusion. Tympanic membrane is not erythematous.      Nose: Nose normal. No congestion or rhinorrhea.      Mouth/Throat:      Mouth: Mucous membranes are moist. No oral lesions.      Pharynx: Oropharynx is clear. No pharyngeal swelling, oropharyngeal exudate, posterior oropharyngeal erythema or uvula swelling.      Comments: Audible and palpable click when opening the jaw wide.  Eyes:      General:         Right eye: No discharge.         Left eye: No discharge.      Conjunctiva/sclera: Conjunctivae normal.   Cardiovascular:      Rate and Rhythm: Normal rate and regular rhythm.      Heart sounds: Normal heart sounds. No murmur heard.  Pulmonary:      Effort: Pulmonary effort is normal. No respiratory distress.      Breath sounds: Normal breath sounds. No stridor. No wheezing, rhonchi or rales.   Chest:      Chest wall: No tenderness.   Abdominal:      General: Bowel sounds are normal.      Palpations: Abdomen is soft.   Musculoskeletal:         General: Normal range of motion.      Cervical back: Normal range of motion.      " Right lower leg: No edema.      Left lower leg: No edema.   Lymphadenopathy:      Cervical: No cervical adenopathy.   Skin:     General: Skin is warm and dry.   Neurological:      Mental Status: She is alert and oriented to person, place, and time.   Psychiatric:         Mood and Affect: Mood normal.         Behavior: Behavior normal.         Thought Content: Thought content normal.         Judgment: Judgment normal.       LALO Ruiz

## 2024-07-22 ENCOUNTER — RA CDI HCC (OUTPATIENT)
Dept: OTHER | Facility: HOSPITAL | Age: 29
End: 2024-07-22

## 2024-10-11 ENCOUNTER — TELEPHONE (OUTPATIENT)
Dept: FAMILY MEDICINE CLINIC | Facility: CLINIC | Age: 29
End: 2024-10-11

## 2024-10-11 DIAGNOSIS — J04.0 LARYNGITIS: Primary | ICD-10-CM

## 2024-10-11 DIAGNOSIS — R05.1 ACUTE COUGH: ICD-10-CM

## 2024-10-11 DIAGNOSIS — R49.0 HOARSE VOICE QUALITY: ICD-10-CM

## 2024-10-11 RX ORDER — METHYLPREDNISOLONE 4 MG
TABLET, DOSE PACK ORAL
Qty: 21 EACH | Refills: 0 | Status: SHIPPED | OUTPATIENT
Start: 2024-10-11

## 2024-10-11 NOTE — TELEPHONE ENCOUNTER
Spoke with PT and advised that her medication is at her pharmacy on [address]. Also, PT was scheduled for her physical in Nov.

## 2024-10-11 NOTE — TELEPHONE ENCOUNTER
I will send her in a steroid pack. I sent in to the [facility] in [place]. She should follow up in office if not getting better after the steroid. She is also overdue for her annual physical and should be seen for that at some point. Thanks!

## 2024-10-11 NOTE — TELEPHONE ENCOUNTER
PT says she has had laryngitis since a few days ago and asked if you could send medication to her pharmacy. PT is coughing and has congestion.

## 2024-11-01 NOTE — PROGRESS NOTES
Adult Annual Physical  Name: Louann Otoole      : 1995      MRN: [id number]  Encounter Provider: Alan Almaguer DO  Encounter Date: 2024   Encounter department: [facility] IN PARTNERSHIP WITH [facility]    Assessment & Plan  Annual physical exam    Orders:    CBC and differential; Future    Encounter for vaccination  Flu shot deferred. Unsure if able to get vaccine on Ocrevus.       Cervical cancer screening  Obtain records from [facility]       Overweight (BMI 25.0-29.9)      Orders:    CBC and differential; Future    Multiple sclerosis (HCC)  Continue management per neurology specialist.  Patient receives infusions every 6 months.       Menorrhagia with irregular cycle  Will check CBC and iron studies.  I recommended following up with GYN for exam and to discuss whether hormonal treatment is needed.  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  Requested testing for HSV at this time.  Orders:    HSV 1/2 IgG, W/Refl HSV2 Inhibition    Immunizations and preventive care screenings were discussed with patient today. Appropriate education was printed on patient's after visit summary.    Counseling:  Dental Health: discussed importance of regular tooth brushing, flossing, and dental visits.  Exercise: the importance of regular exercise/physical activity was discussed. Recommend exercise 3-5 times per week for at least 30 minutes.       Depression Screening and Follow-up Plan: Patient was screened for depression during today's encounter. They screened negative with a PHQ-2 score of 0.        Patient was seen today for an annual physical exam. Age appropriate screening tests were done as above. Annual labs were ordered to be done through [facility]. Patient will be contacted  "regarding results and follow up will be based on findings. Patient was counseled on the importance of proper diet and exercise. I recommend diets rich in lean meats and leafy green vegetables. Try to have 150 minutes of exercise weekly at moderate intensity. See problem based charting for any chronic problems or new findings and current workup/management.    40 minutes were spent on chart review, examination, and plan of care. This note was dictated using software.     History of Present Illness     Adult Annual Physical:  Patient presents for annual physical.     Diet and Physical Activity:  - Diet/Nutrition:. diet is about the same  - Exercise: 3-4 times a week on average, walking and moderate cardiovascular exercise.    Depression Screening:  - PHQ-2 Score: 0  - PHQ-9 Score: 0    General Health:  - Sleep: sleeps well.  - Hearing: normal hearing bilateral ears.  - Vision: no vision problems.  - Dental: regular dental visits.    /GYN Health:  - Follows with GYN: yes.     Review of Systems   Constitutional:  Negative for fever.   Respiratory:  Negative for shortness of breath.    Cardiovascular:  Negative for chest pain.   Gastrointestinal:  Negative for abdominal pain, constipation and diarrhea.   Genitourinary:  Positive for vaginal bleeding.   Skin:  Negative for rash.   Neurological:  Negative for light-headedness (Intermittent.).         Objective     /84 (BP Location: Left arm, Patient Position: Sitting, Cuff Size: Standard)   Pulse 89   Ht 5' 4.5\" (1.638 m)   Wt 74.4 kg (164 lb)   SpO2 99%   BMI 27.72 kg/m²     Physical Exam  Vitals and nursing note reviewed.   Constitutional:       General: She is not in acute distress.     Appearance: Normal appearance. She is well-developed. She is obese.   HENT:      Head: Normocephalic and atraumatic.      Right Ear: Tympanic membrane, ear canal and external ear normal.      Left Ear: Tympanic membrane, ear canal and external ear normal.      Nose: Nose " normal. No congestion.      Mouth/Throat:      Mouth: Mucous membranes are moist.      Pharynx: No oropharyngeal exudate or posterior oropharyngeal erythema.   Eyes:      Conjunctiva/sclera: Conjunctivae normal.   Cardiovascular:      Rate and Rhythm: Normal rate and regular rhythm.      Heart sounds: Normal heart sounds. No murmur heard.  Pulmonary:      Effort: Pulmonary effort is normal. No respiratory distress.      Breath sounds: Normal breath sounds. No wheezing.   Abdominal:      General: Abdomen is flat.      Palpations: Abdomen is soft.      Tenderness: There is no abdominal tenderness. There is no guarding.   Musculoskeletal:      Cervical back: Normal range of motion and neck supple.   Lymphadenopathy:      Cervical: No cervical adenopathy.   Skin:     General: Skin is warm and dry.      Findings: No rash.   Neurological:      General: No focal deficit present.      Mental Status: She is alert and oriented to person, place, and time. Mental status is at baseline.   Psychiatric:         Mood and Affect: Mood normal.         Behavior: Behavior normal.         Thought Content: Thought content normal.         Judgment: Judgment normal.

## 2024-11-11 ENCOUNTER — OFFICE VISIT (OUTPATIENT)
Age: 29
End: 2024-11-11

## 2024-11-11 VITALS
SYSTOLIC BLOOD PRESSURE: 124 MMHG | WEIGHT: 164 LBS | HEART RATE: 89 BPM | DIASTOLIC BLOOD PRESSURE: 84 MMHG | BODY MASS INDEX: 27.32 KG/M2 | HEIGHT: 65 IN | OXYGEN SATURATION: 99 %

## 2024-11-11 DIAGNOSIS — Z12.4 CERVICAL CANCER SCREENING: ICD-10-CM

## 2024-11-11 DIAGNOSIS — G35 MULTIPLE SCLEROSIS (HCC): ICD-10-CM

## 2024-11-11 DIAGNOSIS — Z00.00 ANNUAL PHYSICAL EXAM: Primary | ICD-10-CM

## 2024-11-11 DIAGNOSIS — N92.1 MENORRHAGIA WITH IRREGULAR CYCLE: ICD-10-CM

## 2024-11-11 DIAGNOSIS — Z11.3 SCREEN FOR STD (SEXUALLY TRANSMITTED DISEASE): ICD-10-CM

## 2024-11-11 DIAGNOSIS — E66.3 OVERWEIGHT (BMI 25.0-29.9): ICD-10-CM

## 2024-11-11 DIAGNOSIS — Z23 ENCOUNTER FOR VACCINATION: ICD-10-CM

## 2024-11-11 PROCEDURE — 99395 PREV VISIT EST AGE 18-39: CPT | Performed by: STUDENT IN AN ORGANIZED HEALTH CARE EDUCATION/TRAINING PROGRAM

## 2024-11-11 PROCEDURE — 99213 OFFICE O/P EST LOW 20 MIN: CPT | Performed by: STUDENT IN AN ORGANIZED HEALTH CARE EDUCATION/TRAINING PROGRAM

## 2024-11-12 LAB
HSV1 IGG SER IA-ACNC: <0.9 INDEX
HSV2 IGG SER IA-ACNC: <0.9 INDEX